# Patient Record
Sex: FEMALE | Race: ASIAN | NOT HISPANIC OR LATINO | URBAN - METROPOLITAN AREA
[De-identification: names, ages, dates, MRNs, and addresses within clinical notes are randomized per-mention and may not be internally consistent; named-entity substitution may affect disease eponyms.]

---

## 2023-01-30 ENCOUNTER — EMERGENCY (EMERGENCY)
Facility: HOSPITAL | Age: 33
LOS: 0 days | Discharge: HOME | End: 2023-01-30
Attending: EMERGENCY MEDICINE
Payer: SELF-PAY

## 2023-01-30 VITALS
SYSTOLIC BLOOD PRESSURE: 107 MMHG | HEART RATE: 84 BPM | DIASTOLIC BLOOD PRESSURE: 91 MMHG | OXYGEN SATURATION: 98 % | TEMPERATURE: 98 F | RESPIRATION RATE: 18 BRPM

## 2023-01-30 VITALS — HEIGHT: 64 IN | WEIGHT: 125.66 LBS

## 2023-01-30 DIAGNOSIS — R05.9 COUGH, UNSPECIFIED: ICD-10-CM

## 2023-01-30 DIAGNOSIS — R09.81 NASAL CONGESTION: ICD-10-CM

## 2023-01-30 DIAGNOSIS — J02.9 ACUTE PHARYNGITIS, UNSPECIFIED: ICD-10-CM

## 2023-01-30 DIAGNOSIS — B34.9 VIRAL INFECTION, UNSPECIFIED: ICD-10-CM

## 2023-01-30 PROCEDURE — 99284 EMERGENCY DEPT VISIT MOD MDM: CPT

## 2023-01-30 PROCEDURE — 99282 EMERGENCY DEPT VISIT SF MDM: CPT

## 2023-01-30 RX ORDER — CETIRIZINE HYDROCHLORIDE 10 MG/1
1 TABLET ORAL
Qty: 14 | Refills: 0
Start: 2023-01-30 | End: 2023-02-12

## 2023-01-30 NOTE — ED STATDOCS - PROGRESS NOTE DETAILS
31 y/o F with no PMH presents with sore throat, cough, congestion.   and daughter have similr symptoms. Daughter +for coronavirus sp recently. Denies fever. PE: Well appearing. Cardiac: s1s2, RRR. lungs: CTAB. Abdomen: NBS x4, soft, nontender. A/P: URI, will treat symptomatically and dc home. - Artem Macias PA-C

## 2023-01-30 NOTE — ED STATDOCS - ATTENDING APP SHARED VISIT CONTRIBUTION OF CARE
I personally saw the patient with the MURALI, and completed the key components of the history and physical exam. I then discussed the management plan with the MURALI.

## 2023-01-30 NOTE — ED STATDOCS - CARE PROVIDER_API CALL
Myles Young)  Internal Medicine  [address], [address], [place] [address]  Phone: [phone]  Fax: [phone]  Follow Up Time:

## 2023-01-30 NOTE — ED STATDOCS - PATIENT PORTAL LINK FT
You can access the FollowMyHealth Patient Portal offered by [facility] by registering at the following website: [email or web address]. By joining [employer]’s FollowMyHealth portal, you will also be able to view your health information using other applications (apps) compatible with our system.

## 2023-01-30 NOTE — ED STATDOCS - OBJECTIVE STATEMENT
31 y/o female with no pertinent PMHx presents to the ED with  and daughter c/o congestion, sore throat and cough x3 days. Denies fevers. Worse with laying down. Has been unable to sleep due to symptoms.

## 2023-01-30 NOTE — ED STATDOCS - NS ED ATTENDING STATEMENT MOD
This was a shared visit with the MURALI. I reviewed and verified the documentation and independently performed the documented:

## 2023-01-30 NOTE — ED STATDOCS - CLINICAL SUMMARY MEDICAL DECISION MAKING FREE TEXT BOX
33 y/o female with flu-like symptoms, c/o post nasal drip with worsening cough at night. Has sick contacts at home, daughter with +COVID. Will treat symptomatically and help obtain a PCP follow up.

## 2024-10-22 ENCOUNTER — APPOINTMENT (OUTPATIENT)
Dept: ANTEPARTUM | Facility: CLINIC | Age: 34
End: 2024-10-22

## 2024-10-22 ENCOUNTER — INPATIENT (INPATIENT)
Facility: HOSPITAL | Age: 34
LOS: 2 days | Discharge: HOME HEALTH | End: 2024-10-25
Attending: SPECIALIST | Admitting: SPECIALIST
Payer: COMMERCIAL

## 2024-10-22 VITALS — RESPIRATION RATE: 16 BRPM | TEMPERATURE: 99 F

## 2024-10-22 DIAGNOSIS — O42.90 PREMATURE RUPTURE OF MEMBRANES, UNSPECIFIED AS TO LENGTH OF TIME BETWEEN RUPTURE AND ONSET OF LABOR, UNSPECIFIED WEEKS OF GESTATION: ICD-10-CM

## 2024-10-22 DIAGNOSIS — O26.899 OTHER SPECIFIED PREGNANCY RELATED CONDITIONS, UNSPECIFIED TRIMESTER: ICD-10-CM

## 2024-10-22 LAB
ALBUMIN SERPL ELPH-MCNC: 3.5 G/DL — SIGNIFICANT CHANGE UP (ref 3.3–5)
ALP SERPL-CCNC: 197 U/L — HIGH (ref 40–120)
ALT FLD-CCNC: 45 U/L — HIGH (ref 4–33)
ANION GAP SERPL CALC-SCNC: 15 MMOL/L — HIGH (ref 7–14)
APPEARANCE UR: ABNORMAL
AST SERPL-CCNC: 29 U/L — SIGNIFICANT CHANGE UP (ref 4–32)
BACTERIA # UR AUTO: ABNORMAL /HPF
BASOPHILS # BLD AUTO: 0.07 K/UL — SIGNIFICANT CHANGE UP (ref 0–0.2)
BASOPHILS NFR BLD AUTO: 0.7 % — SIGNIFICANT CHANGE UP (ref 0–2)
BILIRUB SERPL-MCNC: 0.2 MG/DL — SIGNIFICANT CHANGE UP (ref 0.2–1.2)
BILIRUB UR-MCNC: NEGATIVE — SIGNIFICANT CHANGE UP
BLD GP AB SCN SERPL QL: NEGATIVE — SIGNIFICANT CHANGE UP
BUN SERPL-MCNC: 13 MG/DL — SIGNIFICANT CHANGE UP (ref 7–23)
CALCIUM SERPL-MCNC: 9.7 MG/DL — SIGNIFICANT CHANGE UP (ref 8.4–10.5)
CAST: 4 /LPF — SIGNIFICANT CHANGE UP (ref 0–4)
CHLORIDE SERPL-SCNC: 105 MMOL/L — SIGNIFICANT CHANGE UP (ref 98–107)
CO2 SERPL-SCNC: 19 MMOL/L — LOW (ref 22–31)
COLOR SPEC: YELLOW — SIGNIFICANT CHANGE UP
CREAT ?TM UR-MCNC: 24 MG/DL — SIGNIFICANT CHANGE UP
CREAT SERPL-MCNC: 0.67 MG/DL — SIGNIFICANT CHANGE UP (ref 0.5–1.3)
DIFF PNL FLD: NEGATIVE — SIGNIFICANT CHANGE UP
EGFR: 116 ML/MIN/1.73M2 — SIGNIFICANT CHANGE UP
EOSINOPHIL # BLD AUTO: 0.13 K/UL — SIGNIFICANT CHANGE UP (ref 0–0.5)
EOSINOPHIL NFR BLD AUTO: 1.4 % — SIGNIFICANT CHANGE UP (ref 0–6)
GLUCOSE SERPL-MCNC: 65 MG/DL — LOW (ref 70–99)
GLUCOSE UR QL: NEGATIVE MG/DL — SIGNIFICANT CHANGE UP
HCT VFR BLD CALC: 40.8 % — SIGNIFICANT CHANGE UP (ref 34.5–45)
HGB BLD-MCNC: 13.6 G/DL — SIGNIFICANT CHANGE UP (ref 11.5–15.5)
IANC: 6.08 K/UL — SIGNIFICANT CHANGE UP (ref 1.8–7.4)
IMM GRANULOCYTES NFR BLD AUTO: 1.5 % — HIGH (ref 0–0.9)
KETONES UR-MCNC: NEGATIVE MG/DL — SIGNIFICANT CHANGE UP
LDH SERPL L TO P-CCNC: 171 U/L — SIGNIFICANT CHANGE UP (ref 135–225)
LEUKOCYTE ESTERASE UR-ACNC: NEGATIVE — SIGNIFICANT CHANGE UP
LYMPHOCYTES # BLD AUTO: 2.06 K/UL — SIGNIFICANT CHANGE UP (ref 1–3.3)
LYMPHOCYTES # BLD AUTO: 21.7 % — SIGNIFICANT CHANGE UP (ref 13–44)
MCHC RBC-ENTMCNC: 25.7 PG — LOW (ref 27–34)
MCHC RBC-ENTMCNC: 33.3 GM/DL — SIGNIFICANT CHANGE UP (ref 32–36)
MCV RBC AUTO: 77.1 FL — LOW (ref 80–100)
MONOCYTES # BLD AUTO: 1.03 K/UL — HIGH (ref 0–0.9)
MONOCYTES NFR BLD AUTO: 10.8 % — SIGNIFICANT CHANGE UP (ref 2–14)
NEUTROPHILS # BLD AUTO: 6.08 K/UL — SIGNIFICANT CHANGE UP (ref 1.8–7.4)
NEUTROPHILS NFR BLD AUTO: 63.9 % — SIGNIFICANT CHANGE UP (ref 43–77)
NITRITE UR-MCNC: NEGATIVE — SIGNIFICANT CHANGE UP
NRBC # BLD: 0 /100 WBCS — SIGNIFICANT CHANGE UP (ref 0–0)
NRBC # FLD: 0.03 K/UL — HIGH (ref 0–0)
PH UR: 6.5 — SIGNIFICANT CHANGE UP (ref 5–8)
PLATELET # BLD AUTO: 131 K/UL — LOW (ref 150–400)
POTASSIUM SERPL-MCNC: 4.3 MMOL/L — SIGNIFICANT CHANGE UP (ref 3.5–5.3)
POTASSIUM SERPL-SCNC: 4.3 MMOL/L — SIGNIFICANT CHANGE UP (ref 3.5–5.3)
PROT ?TM UR-MCNC: 26 MG/DL — SIGNIFICANT CHANGE UP
PROT SERPL-MCNC: 6.7 G/DL — SIGNIFICANT CHANGE UP (ref 6–8.3)
PROT UR-MCNC: 30 MG/DL
PROT/CREAT UR-RTO: 1.1 RATIO — HIGH (ref 0–0.2)
RBC # BLD: 5.29 M/UL — HIGH (ref 3.8–5.2)
RBC # FLD: 13.8 % — SIGNIFICANT CHANGE UP (ref 10.3–14.5)
RBC CASTS # UR COMP ASSIST: SIGNIFICANT CHANGE UP /HPF (ref 0–4)
REVIEW: SIGNIFICANT CHANGE UP
RH IG SCN BLD-IMP: POSITIVE — SIGNIFICANT CHANGE UP
RH IG SCN BLD-IMP: POSITIVE — SIGNIFICANT CHANGE UP
SODIUM SERPL-SCNC: 139 MMOL/L — SIGNIFICANT CHANGE UP (ref 135–145)
SP GR SPEC: 1.01 — SIGNIFICANT CHANGE UP (ref 1–1.03)
SQUAMOUS # UR AUTO: 7 /HPF — HIGH (ref 0–5)
URATE SERPL-MCNC: 8.1 MG/DL — HIGH (ref 2.5–7)
UROBILINOGEN FLD QL: 0.2 MG/DL — SIGNIFICANT CHANGE UP (ref 0.2–1)
WBC # BLD: 9.51 K/UL — SIGNIFICANT CHANGE UP (ref 3.8–10.5)
WBC # FLD AUTO: 9.51 K/UL — SIGNIFICANT CHANGE UP (ref 3.8–10.5)
WBC UR QL: 4 /HPF — SIGNIFICANT CHANGE UP (ref 0–5)

## 2024-10-22 RX ORDER — OXYTOCIN IN D5W-0.2% SODIUM CL 15/250 ML
167 PLASTIC BAG, INJECTION (ML) INTRAVENOUS
Qty: 30 | Refills: 0 | Status: DISCONTINUED | OUTPATIENT
Start: 2024-10-22 | End: 2024-10-23

## 2024-10-22 RX ORDER — FAMOTIDINE 10 MG/ML
20 INJECTION INTRAVENOUS ONCE
Refills: 0 | Status: DISCONTINUED | OUTPATIENT
Start: 2024-10-22 | End: 2024-10-22

## 2024-10-22 RX ORDER — ONDANSETRON HYDROCHLORIDE 2 MG/ML
4 INJECTION, SOLUTION INTRAMUSCULAR; INTRAVENOUS ONCE
Refills: 0 | Status: DISCONTINUED | OUTPATIENT
Start: 2024-10-22 | End: 2024-10-22

## 2024-10-22 RX ORDER — CHOLECALCIFEROL (VITAMIN D3) 625 MCG
1 CAPSULE ORAL
Refills: 0 | DISCHARGE

## 2024-10-22 RX ORDER — ACETAMINOPHEN 500 MG
1000 TABLET ORAL ONCE
Refills: 0 | Status: DISCONTINUED | OUTPATIENT
Start: 2024-10-22 | End: 2024-10-22

## 2024-10-22 RX ORDER — CHLORHEXIDINE GLUCONATE 40 MG/ML
1 SOLUTION TOPICAL DAILY
Refills: 0 | Status: DISCONTINUED | OUTPATIENT
Start: 2024-10-22 | End: 2024-10-23

## 2024-10-22 RX ADMIN — CHLORHEXIDINE GLUCONATE 1 APPLICATION(S): 40 SOLUTION TOPICAL at 21:32

## 2024-10-22 RX ADMIN — Medication 125 MILLILITER(S): at 21:32

## 2024-10-22 NOTE — OB PROVIDER H&P - PROBLEM SELECTOR PLAN 1
D/w Dr Rosales  -Admit to labor and delivery  -Pain Management: Approved for epidural  -Cont EFM/[place]  -Admission labs: CBC, RPR, T&S  -IV hydration  -Clear liquid diet  -Cytotec PO for IOL

## 2024-10-22 NOTE — OB RN TRIAGE NOTE - FALL HARM RISK - UNIVERSAL INTERVENTIONS
Bed in lowest position, wheels locked, appropriate side rails in place/Call bell, personal items and telephone in reach/Instruct patient to call for assistance before getting out of bed or chair/Non-slip footwear when patient is out of bed/[place] to call system/Physically safe environment - no spills, clutter or unnecessary equipment/Purposeful Proactive Rounding/Room/bathroom lighting operational, light cord in reach

## 2024-10-22 NOTE — OB PROVIDER H&P - NSLOWPPHRISK_OBGYN_A_OB
No previous uterine incision/[place] Pregnancy/Less than or equal to 4 previous vaginal births/No known bleeding disorder/No history of postpartum hemorrhage/No other PPH risks indicated

## 2024-10-22 NOTE — OB RN PATIENT PROFILE - NSGBSSTATUS_OBGYN_ALL_OB
Detail Level: Simple Continue Regimen: Triamcinolone cream- use prn for flares Render In Strict Bullet Format?: No Negative

## 2024-10-22 NOTE — OB RN PATIENT PROFILE - 'COMMUNITY AGENCIES/SUPPORT GROUPS, OB PROFILE
pt reports qualifying for WIC and going to office but not able to get scheduled for an appointment to enrol

## 2024-10-22 NOTE — OB RN PATIENT PROFILE - PRO PRENATAL LABS ORI SOURCE HBSAG
Addended by: BECKY OZUNA JR. on: 5/16/2022 04:23 PM     Modules accepted: Orders    
show
hard copy, drawn during this pregnancy

## 2024-10-22 NOTE — OB PROVIDER H&P - NSHPPHYSICALEXAM_GEN_ALL_CORE
T(C): 37.2 (10-22-24 @ 19:38), Max: 37.2 (10-22-24 @ 19:28)  HR: 51 (10-22-24 @ 21:29) (46 - 62)  BP: 120/76 (10-22-24 @ 21:29) (120/73 - 142/80)  RR: 16 (10-22-24 @ 19:38) (16 - 16)    Heart: RRR  Lungs: CTA  Abdomen: Gravid, soft, NT    NST: Reactive with moderate variability, Category 1 tracing  [place]: Irregular contractions  VE: 3/60/-3, gross ROM, clear fluid  TAS: Cephalic presentation

## 2024-10-22 NOTE — OB RN TRIAGE NOTE - PAIN SCALE PREFERRED, PROFILE
This visit was performed via live interactive two-way video.    During their visit, the patient was informed that their consent to treat includes permission to submit claims to their insurance on their behalf for the services received.  Clinician Location: [facility]    Patient Location: Home       This office note has been dictated.      It should be noted that the patient's list of drug allergies/sensitivities, current medication list, problem list, and past medical history were reviewed today by me with the patient and in Epic, and updated accordingly.    The following diagnoses have been reviewed with the patient at today's visit and appropriate recommendations made:    Anemia, unspecified type  (primary encounter diagnosis)  Acute on chronic systolic congestive heart failure (CMS/HCC)  Mild major depression (CMS/HCC)  Morbid obesity (CMS/HCC)  Personal history of colonic polyps  Severe obesity (BMI 35.0-39.9) with comorbidity (CMS/HCC)       numerical 0-10

## 2024-10-22 NOTE — OB PROVIDER H&P - HISTORY OF PRESENT ILLNESS
34y  at 37w6d presents to triage c/o LOF since 6:15pm today, reports clear fluid. Pt states feeling mild contractions. Reports +FM, no vaginal bleeding  Prenatal care: Dr Cabral  GBS: Negative    Prenatal course complicated by:   - h/o hypothyroidism on Synthroid

## 2024-10-22 NOTE — OB PROVIDER H&P - NS_CTXBYPALP_OBGYN_ALL_OB
Impression: Primary open-angle glaucoma, left eye, severe stage: A93.1417. CCT: 534/522         S/P LPI OU Plan: IOP is well lowered on Cosopt BID OU and Vyzulta QHS OS. Will continue on current regime. Pt. c/o irritation OU today. Will continue to monitor closely. Patient to call us sooner, onset of new visual changes. Vyzulta samples given. Due to increased VF defects will get second opinion by Glaucoma spec.  

FINAL MEDS: COSOPT OU BID and Vyzulta QHS OS Only Mild

## 2024-10-22 NOTE — OB PROVIDER H&P - ASSESSMENT
34y  at 37w6d s/p PROM @ 6:15pm  - GBS: Negative  - Pt found to have elevated BP in triage, denies any headache, no visual changes, no epigastric pain, no nausea, no vomiting  D/w Dr Rosales  -Admit to labor and delivery  -Pain Management: Approved for epidural  -Cont EFM/[place]  -Admission labs: CBC, RPR, T&S  -IV hydration  -Clear liquid diet  -Cytotec PO for IOL

## 2024-10-22 NOTE — OB RN PATIENT PROFILE - AS SC BRADEN ACTIVITY
Time spent on patient encounter including emergency department chart review, history taking and physical exam, developing patient plan and tailoring H&P, as well as discussion with patient, family, RN, and other providers involved in patient's care.
(4) walks frequently

## 2024-10-22 NOTE — OB PROVIDER H&P - LABOR: FETAL STATION
From: Emanuel Hines  To: Kirk Willoughby MD  Sent: 3/20/2019 12:10 PM CDT  Subject: Medication Question    Hi,  Dr. Yuen's office asked that I contact you about the warfarin. They need it noted in [facility] that withholding warfarin is ok. They want me to withhold for three days, you had mentioned 5 days, like usual. I told them and they reiterated they only need three. I am happy to do any amount you want. Appt is April 3rd.    Thank you for contacting them to [facility] (I am assuming you did it).    Thanks,  Emanuel  
-3

## 2024-10-23 ENCOUNTER — TRANSCRIPTION ENCOUNTER (OUTPATIENT)
Age: 34
End: 2024-10-23

## 2024-10-23 LAB
ALBUMIN SERPL ELPH-MCNC: 3.3 G/DL — SIGNIFICANT CHANGE UP (ref 3.3–5)
ALP SERPL-CCNC: 175 U/L — HIGH (ref 40–120)
ALT FLD-CCNC: 46 U/L — HIGH (ref 4–33)
ANION GAP SERPL CALC-SCNC: 13 MMOL/L — SIGNIFICANT CHANGE UP (ref 7–14)
AST SERPL-CCNC: 38 U/L — HIGH (ref 4–32)
BASOPHILS # BLD AUTO: 0.05 K/UL — SIGNIFICANT CHANGE UP (ref 0–0.2)
BASOPHILS NFR BLD AUTO: 0.4 % — SIGNIFICANT CHANGE UP (ref 0–2)
BILIRUB SERPL-MCNC: 0.2 MG/DL — SIGNIFICANT CHANGE UP (ref 0.2–1.2)
BUN SERPL-MCNC: 15 MG/DL — SIGNIFICANT CHANGE UP (ref 7–23)
CALCIUM SERPL-MCNC: 9.2 MG/DL — SIGNIFICANT CHANGE UP (ref 8.4–10.5)
CHLORIDE SERPL-SCNC: 104 MMOL/L — SIGNIFICANT CHANGE UP (ref 98–107)
CO2 SERPL-SCNC: 21 MMOL/L — LOW (ref 22–31)
CREAT SERPL-MCNC: 0.87 MG/DL — SIGNIFICANT CHANGE UP (ref 0.5–1.3)
EGFR: 88 ML/MIN/1.73M2 — SIGNIFICANT CHANGE UP
EOSINOPHIL # BLD AUTO: 0.04 K/UL — SIGNIFICANT CHANGE UP (ref 0–0.5)
EOSINOPHIL NFR BLD AUTO: 0.3 % — SIGNIFICANT CHANGE UP (ref 0–6)
GLUCOSE SERPL-MCNC: 82 MG/DL — SIGNIFICANT CHANGE UP (ref 70–99)
HCT VFR BLD CALC: 37.2 % — SIGNIFICANT CHANGE UP (ref 34.5–45)
HGB BLD-MCNC: 12.4 G/DL — SIGNIFICANT CHANGE UP (ref 11.5–15.5)
IANC: 10.23 K/UL — HIGH (ref 1.8–7.4)
IMM GRANULOCYTES NFR BLD AUTO: 1 % — HIGH (ref 0–0.9)
LDH SERPL L TO P-CCNC: 216 U/L — SIGNIFICANT CHANGE UP (ref 135–225)
LYMPHOCYTES # BLD AUTO: 1.98 K/UL — SIGNIFICANT CHANGE UP (ref 1–3.3)
LYMPHOCYTES # BLD AUTO: 14.2 % — SIGNIFICANT CHANGE UP (ref 13–44)
MCHC RBC-ENTMCNC: 25.7 PG — LOW (ref 27–34)
MCHC RBC-ENTMCNC: 33.3 GM/DL — SIGNIFICANT CHANGE UP (ref 32–36)
MCV RBC AUTO: 77 FL — LOW (ref 80–100)
MONOCYTES # BLD AUTO: 1.5 K/UL — HIGH (ref 0–0.9)
MONOCYTES NFR BLD AUTO: 10.8 % — SIGNIFICANT CHANGE UP (ref 2–14)
NEUTROPHILS # BLD AUTO: 10.23 K/UL — HIGH (ref 1.8–7.4)
NEUTROPHILS NFR BLD AUTO: 73.3 % — SIGNIFICANT CHANGE UP (ref 43–77)
NRBC # BLD: 0 /100 WBCS — SIGNIFICANT CHANGE UP (ref 0–0)
NRBC # FLD: 0.02 K/UL — HIGH (ref 0–0)
PLATELET # BLD AUTO: 129 K/UL — LOW (ref 150–400)
POTASSIUM SERPL-MCNC: 4.4 MMOL/L — SIGNIFICANT CHANGE UP (ref 3.5–5.3)
POTASSIUM SERPL-SCNC: 4.4 MMOL/L — SIGNIFICANT CHANGE UP (ref 3.5–5.3)
PROT SERPL-MCNC: 5.9 G/DL — LOW (ref 6–8.3)
RBC # BLD: 4.83 M/UL — SIGNIFICANT CHANGE UP (ref 3.8–5.2)
RBC # FLD: 13.9 % — SIGNIFICANT CHANGE UP (ref 10.3–14.5)
SODIUM SERPL-SCNC: 138 MMOL/L — SIGNIFICANT CHANGE UP (ref 135–145)
T PALLIDUM AB TITR SER: NEGATIVE — SIGNIFICANT CHANGE UP
URATE SERPL-MCNC: 8.3 MG/DL — HIGH (ref 2.5–7)
WBC # BLD: 13.94 K/UL — HIGH (ref 3.8–10.5)
WBC # FLD AUTO: 13.94 K/UL — HIGH (ref 3.8–10.5)

## 2024-10-23 PROCEDURE — 93010 ELECTROCARDIOGRAM REPORT: CPT

## 2024-10-23 RX ORDER — HYDROCORTISONE 1 %
1 OINTMENT (GRAM) TOPICAL EVERY 6 HOURS
Refills: 0 | Status: DISCONTINUED | OUTPATIENT
Start: 2024-10-23 | End: 2024-10-25

## 2024-10-23 RX ORDER — IBUPROFEN 200 MG
600 TABLET ORAL EVERY 6 HOURS
Refills: 0 | Status: COMPLETED | OUTPATIENT
Start: 2024-10-23 | End: 2025-09-21

## 2024-10-23 RX ORDER — MODIFIED LANOLIN
1 OINTMENT (GRAM) TOPICAL EVERY 6 HOURS
Refills: 0 | Status: DISCONTINUED | OUTPATIENT
Start: 2024-10-23 | End: 2024-10-25

## 2024-10-23 RX ORDER — CLOSTRIDIUM TETANI TOXOID ANTIGEN (FORMALDEHYDE INACTIVATED), CORYNEBACTERIUM DIPHTHERIAE TOXOID ANTIGEN (FORMALDEHYDE INACTIVATED), BORDETELLA PERTUSSIS TOXOID ANTIGEN (GLUTARALDEHYDE INACTIVATED), BORDETELLA PERTUSSIS FILAMENTOUS HEMAGGLUTININ ANTIGEN (FORMALDEHYDE INACTIVATED), BORDETELLA PERTUSSIS PERTACTIN ANTIGEN, AND BORDETELLA PERTUSSIS FIMBRIAE 2/3 ANTIGEN 5; 2; 2.5; 5; 3; 5 [LF]/.5ML; [LF]/.5ML; UG/.5ML; UG/.5ML; UG/.5ML; UG/.5ML
0.5 INJECTION, SUSPENSION INTRAMUSCULAR ONCE
Refills: 0 | Status: DISCONTINUED | OUTPATIENT
Start: 2024-10-23 | End: 2024-10-25

## 2024-10-23 RX ORDER — DIPHENHYDRAMINE HCL 12.5MG/5ML
25 ELIXIR ORAL EVERY 6 HOURS
Refills: 0 | Status: DISCONTINUED | OUTPATIENT
Start: 2024-10-23 | End: 2024-10-25

## 2024-10-23 RX ORDER — ACETAMINOPHEN 500 MG
975 TABLET ORAL
Refills: 0 | Status: DISCONTINUED | OUTPATIENT
Start: 2024-10-23 | End: 2024-10-25

## 2024-10-23 RX ORDER — OXYTOCIN IN D5W-0.2% SODIUM CL 15/250 ML
167 PLASTIC BAG, INJECTION (ML) INTRAVENOUS
Qty: 30 | Refills: 0 | Status: DISCONTINUED | OUTPATIENT
Start: 2024-10-23 | End: 2024-10-23

## 2024-10-23 RX ORDER — SIMETHICONE 80 MG/1
80 TABLET, CHEWABLE ORAL EVERY 4 HOURS
Refills: 0 | Status: DISCONTINUED | OUTPATIENT
Start: 2024-10-23 | End: 2024-10-25

## 2024-10-23 RX ORDER — IBUPROFEN 200 MG
600 TABLET ORAL EVERY 6 HOURS
Refills: 0 | Status: DISCONTINUED | OUTPATIENT
Start: 2024-10-23 | End: 2024-10-25

## 2024-10-23 RX ORDER — LEVOTHYROXINE SODIUM 88 MCG
25 TABLET ORAL DAILY
Refills: 0 | Status: DISCONTINUED | OUTPATIENT
Start: 2024-10-23 | End: 2024-10-25

## 2024-10-23 RX ORDER — LEVOTHYROXINE SODIUM 88 MCG
1 TABLET ORAL
Refills: 0 | DISCHARGE

## 2024-10-23 RX ORDER — LEVOTHYROXINE SODIUM 88 MCG
50 TABLET ORAL DAILY
Refills: 0 | Status: DISCONTINUED | OUTPATIENT
Start: 2024-10-23 | End: 2024-10-23

## 2024-10-23 RX ORDER — DIBUCAINE 1 %
1 OINTMENT (GRAM) TOPICAL EVERY 6 HOURS
Refills: 0 | Status: DISCONTINUED | OUTPATIENT
Start: 2024-10-23 | End: 2024-10-25

## 2024-10-23 RX ORDER — MAGNESIUM HYDROXIDE 1200 MG/15ML
30 SUSPENSION ORAL
Refills: 0 | Status: DISCONTINUED | OUTPATIENT
Start: 2024-10-23 | End: 2024-10-25

## 2024-10-23 RX ORDER — ACETAMINOPHEN 500 MG
3 TABLET ORAL
Qty: 0 | Refills: 0 | DISCHARGE
Start: 2024-10-23

## 2024-10-23 RX ORDER — SODIUM CHLORIDE 9 MG/ML
3 INJECTION, SOLUTION INTRAMUSCULAR; INTRAVENOUS; SUBCUTANEOUS EVERY 8 HOURS
Refills: 0 | Status: DISCONTINUED | OUTPATIENT
Start: 2024-10-23 | End: 2024-10-25

## 2024-10-23 RX ORDER — KETOROLAC TROMETHAMINE 30 MG/ML
30 INJECTION INTRAMUSCULAR; INTRAVENOUS ONCE
Refills: 0 | Status: DISCONTINUED | OUTPATIENT
Start: 2024-10-23 | End: 2024-10-23

## 2024-10-23 RX ORDER — PRENATAL VIT/IRON FUM/FOLIC AC 60 MG-1 MG
1 TABLET ORAL
Refills: 0 | DISCHARGE

## 2024-10-23 RX ORDER — OXYCODONE HYDROCHLORIDE 30 MG/1
5 TABLET ORAL
Refills: 0 | Status: DISCONTINUED | OUTPATIENT
Start: 2024-10-23 | End: 2024-10-25

## 2024-10-23 RX ORDER — BENZOCAINE 200 MG/G
1 GEL ORAL EVERY 6 HOURS
Refills: 0 | Status: DISCONTINUED | OUTPATIENT
Start: 2024-10-23 | End: 2024-10-25

## 2024-10-23 RX ORDER — IBUPROFEN 200 MG
1 TABLET ORAL
Qty: 0 | Refills: 0 | DISCHARGE
Start: 2024-10-23

## 2024-10-23 RX ORDER — OXYCODONE HYDROCHLORIDE 30 MG/1
5 TABLET ORAL ONCE
Refills: 0 | Status: DISCONTINUED | OUTPATIENT
Start: 2024-10-23 | End: 2024-10-25

## 2024-10-23 RX ORDER — PRAMOXINE HCL 1 %
1 CREAM (GRAM) RECTAL EVERY 4 HOURS
Refills: 0 | Status: DISCONTINUED | OUTPATIENT
Start: 2024-10-23 | End: 2024-10-25

## 2024-10-23 RX ORDER — PRENATAL VIT/IRON FUM/FOLIC AC 60 MG-1 MG
1 TABLET ORAL
Qty: 0 | Refills: 0 | DISCHARGE
Start: 2024-10-23

## 2024-10-23 RX ORDER — PRENATAL VIT/IRON FUM/FOLIC AC 60 MG-1 MG
1 TABLET ORAL DAILY
Refills: 0 | Status: DISCONTINUED | OUTPATIENT
Start: 2024-10-23 | End: 2024-10-25

## 2024-10-23 RX ADMIN — Medication 25 MICROGRAM(S): at 07:45

## 2024-10-23 RX ADMIN — KETOROLAC TROMETHAMINE 30 MILLIGRAM(S): 30 INJECTION INTRAMUSCULAR; INTRAVENOUS at 07:55

## 2024-10-23 RX ADMIN — Medication 600 MILLIGRAM(S): at 17:41

## 2024-10-23 RX ADMIN — SODIUM CHLORIDE 3 MILLILITER(S): 9 INJECTION, SOLUTION INTRAMUSCULAR; INTRAVENOUS; SUBCUTANEOUS at 21:33

## 2024-10-23 RX ADMIN — Medication 600 MILLIGRAM(S): at 23:51

## 2024-10-23 RX ADMIN — Medication 600 MILLIGRAM(S): at 18:15

## 2024-10-23 RX ADMIN — KETOROLAC TROMETHAMINE 30 MILLIGRAM(S): 30 INJECTION INTRAMUSCULAR; INTRAVENOUS at 06:20

## 2024-10-23 RX ADMIN — Medication 975 MILLIGRAM(S): at 21:23

## 2024-10-23 RX ADMIN — SODIUM CHLORIDE 3 MILLILITER(S): 9 INJECTION, SOLUTION INTRAMUSCULAR; INTRAVENOUS; SUBCUTANEOUS at 15:30

## 2024-10-23 RX ADMIN — Medication 975 MILLIGRAM(S): at 22:20

## 2024-10-23 RX ADMIN — Medication 167 MILLIUNIT(S)/MIN: at 05:56

## 2024-10-23 NOTE — LACTATION INITIAL EVALUATION - NS_LMP_OBGYN_ALL_OB_DT
Postoperatively, undifferentiated, resolved  Weaned off Wilder-Synephrine, no epi drip never started  Status post additional fluid bolus given findings on bedside echo and no evidence of decompensation of heart failure, blood pressure improved and pressors weaned off  Following serial CBC, no evidence of bleeding clinically, hematocrit now normal   31-Jan-2024

## 2024-10-23 NOTE — DISCHARGE NOTE OB - FINANCIAL ASSISTANCE
[facility] provides services at a reduced cost to those who are determined to be eligible through [facility]’s financial assistance program. Information regarding [facility]’s financial assistance program can be found by going to [email or web address] or by calling [phone].

## 2024-10-23 NOTE — DISCHARGE NOTE OB - CARE PROVIDER_API CALL
Joe Cabral  Obstetrics and Gynecology  [address] [place] [address], [address]  [place] [address]  Phone: [phone]  Fax: [phone]  Follow Up Time:

## 2024-10-23 NOTE — OB RN DELIVERY SUMMARY - NS_SEPSISRSKCALC_OBGYN_ALL_OB_FT
EOS calculated successfully. EOS Risk Factor: 0.5/1000 live births ([facility] national incidence); GA=38w;Temp=99; ROM=11.05; GBS='Negative'; Antibiotics='No antibiotics or any antibiotics < 2 hrs prior to birth'

## 2024-10-23 NOTE — DISCHARGE NOTE OB - PATIENT PORTAL LINK FT
You can access the FollowMyHealth Patient Portal offered by [facility] by registering at the following website: [email or web address]. By joining [employer]’s FollowMyHealth portal, you will also be able to view your health information using other applications (apps) compatible with our system.

## 2024-10-23 NOTE — LACTATION INITIAL EVALUATION - LACTATION INTERVENTIONS
initiate/review safe skin-to-skin/initiate/review hand expression/initiate/review techniques for position and latch/review techniques to increase milk supply/initiate/review breast massage/compression/reviewed components of an effective feeding and at least 8 effective feedings per day required/reviewed importance of monitoring infant diapers, the breastfeeding log, and minimum output each day/reviewed risks of unnecessary formula supplementation/reviewed benefits and recommendations for rooming in/reviewed feeding on demand/by cue at least 8 times a day

## 2024-10-23 NOTE — DISCHARGE NOTE OB - CARE PLAN
Principal Discharge DX:	Vaginal delivery  Assessment and plan of treatment:	After discharge, please stay on pelvic rest for 6 weeks, meaning no sexual intercourse, no tampons and no douching.  No driving for 2 weeks.  No lifting objects heavier than baby for 2 weeks.  Expect to have vaginal bleeding/spotting for up to six weeks.  The bleeding should get lighter and more white/light brown with time.  For bleeding soaking more than a pad an hour or passing clots greater than the size of your fist, come in to the   emergency department.  Follow up in the office in 6 weeks  Secondary Diagnosis:	Pre-eclampsia  Assessment and plan of treatment:	Follow-up in your OB office within 1 week for a blood pressure check.   Please take your blood pressure 3x/day. Call your doctor if your blood pressure is 140 (top number) OR 90 (bottom number) or higher. Return to hospital if your blood pressure is 160 (top number)  (bottom number) or higher. Call your doctor if you experience symptoms such as headache, blurry vision, epigastric pain, or nausea/vomiting.   1

## 2024-10-23 NOTE — OB PROVIDER DELIVERY SUMMARY - NSSELHIDDEN_OBGYN_ALL_OB_FT
[NS_DeliveryAttending1_OBGYN_ALL_OB_FT:[id number]],[NS_DeliveryAssist1_OBGYN_ALL_OB_FT:[id number]]

## 2024-10-23 NOTE — DISCHARGE NOTE OB - MEDICATION SUMMARY - MEDICATIONS TO TAKE
I will START or STAY ON the medications listed below when I get home from the hospital:    ibuprofen 600 mg oral tablet  -- 1 tab(s) by mouth every 6 hours as needed for  moderate pain  -- Indication: For Pain    acetaminophen 325 mg oral tablet  -- 3 tab(s) by mouth every 6 hours as needed for  mild pain  -- Indication: For Pain    multivitamin, prenatal  -- 1 tab(s) by mouth once a day  -- Indication: For Vitamin    Vitamin D3 25 mcg (1000 intl units) oral tablet  -- 1 tab(s) by mouth once a day  -- Indication: For Vitamin

## 2024-10-23 NOTE — OB RN DELIVERY SUMMARY - NSSELHIDDEN_OBGYN_ALL_OB_FT
[NS_[email or web address]_ALL_OB_FT:[id number]],[NS_DeliveryAssist1_OBGYN_ALL_OB_FT:[id number]],[NS_DeliveryRN_OBGYN_ALL_OB_FT:[id number]=]

## 2024-10-23 NOTE — DISCHARGE NOTE OB - PLAN OF CARE
Follow-up in your OB office within 1 week for a blood pressure check.   Please take your blood pressure 3x/day. Call your doctor if your blood pressure is 140 (top number) OR 90 (bottom number) or higher. Return to hospital if your blood pressure is 160 (top number)  (bottom number) or higher. Call your doctor if you experience symptoms such as headache, blurry vision, epigastric pain, or nausea/vomiting. After discharge, please stay on pelvic rest for 6 weeks, meaning no sexual intercourse, no tampons and no douching.  No driving for 2 weeks.  No lifting objects heavier than baby for 2 weeks.  Expect to have vaginal bleeding/spotting for up to six weeks.  The bleeding should get lighter and more white/light brown with time.  For bleeding soaking more than a pad an hour or passing clots greater than the size of your fist, come in to the   emergency department.  Follow up in the office in 6 weeks

## 2024-10-23 NOTE — LACTATION INITIAL EVALUATION - INTERVENTION OUTCOME
Utilized Guide to Postpartum and [place] [facility] book as a visual reference for mom to better understand teaching points and to utilize at home  to review the education presented during hospitalization. Instructed in hand expression with good return demonstration. + colostrum noted. Encouraged to breastfeed the baby on demand based on cues and at least 8-12 times in a day. Instructed to log feedings along with wet and dirty diapers.  <24 hours old, mother just fed, Encouraged to call for assistance as needed. lactation to follow up./verbalizes understanding/demonstrates understanding of teaching/good return demonstration/needs met/Lactation team to follow up

## 2024-10-23 NOTE — OB RN DELIVERY SUMMARY - NS_CORDVESSELS_OBGYN_ALL_OB
Received a call from patients wife Kirsty who we have ambulatory consent to speak with. She says that she does not think [facility] coverage covers Cardiology, just EP and is needing to reschedule the CT and office visit after. Chart review, telephone encounter on 5/31/22 shows [facility] auth for Cardiology expires 7/5/23. Discussed with Kirsty. She is going to reschedule CT and call the clinic back after to move the office visit to after the testing is complete. She had no other questions at this time. Writer transferred her to pre services to assist.    3

## 2024-10-23 NOTE — PROVIDER CONTACT NOTE (OTHER) - BACKGROUND
38 week NSD from 10/23 ! 0518,  2nd degree laceration. gHTN- PCR 1.1 preeclamptic without severe features. HELLP labs sent to be resent 0600. Hypothyroidism on synthroid 25mcg.

## 2024-10-23 NOTE — OB PROVIDER DELIVERY SUMMARY - NSPROVIDERDELIVERYNOTE_OBGYN_ALL_OB_FT
Spontaneous vaginal delivery of liveborn infant from OA position. Head, shoulders, and body delivered easily. Infant was suctioned. No mec. Delayed cord clamping and infant was passed to mother. Cord clamped and cut. Placenta delivered intact. Fundal massage was given and uterine fundus was found to be firm. Vaginal exam revealed an intact cervix, vaginal walls and sulci. Patient had a 2nd degree laceration in the perineum that was repaired with 2.0 chromic suture. Excellent hemostasis was noted. Patient was stable and went to recovery. Count was correct x 2.    Millie Calin PGY4
No

## 2024-10-24 LAB
ALBUMIN SERPL ELPH-MCNC: 3.2 G/DL — LOW (ref 3.3–5)
ALP SERPL-CCNC: 166 U/L — HIGH (ref 40–120)
ALT FLD-CCNC: 47 U/L — HIGH (ref 4–33)
ANION GAP SERPL CALC-SCNC: 14 MMOL/L — SIGNIFICANT CHANGE UP (ref 7–14)
AST SERPL-CCNC: 37 U/L — HIGH (ref 4–32)
BASOPHILS # BLD AUTO: 0.08 K/UL — SIGNIFICANT CHANGE UP (ref 0–0.2)
BASOPHILS NFR BLD AUTO: 0.6 % — SIGNIFICANT CHANGE UP (ref 0–2)
BILIRUB SERPL-MCNC: <0.2 MG/DL — SIGNIFICANT CHANGE UP (ref 0.2–1.2)
BUN SERPL-MCNC: 16 MG/DL — SIGNIFICANT CHANGE UP (ref 7–23)
CALCIUM SERPL-MCNC: 9 MG/DL — SIGNIFICANT CHANGE UP (ref 8.4–10.5)
CHLORIDE SERPL-SCNC: 102 MMOL/L — SIGNIFICANT CHANGE UP (ref 98–107)
CO2 SERPL-SCNC: 21 MMOL/L — LOW (ref 22–31)
CREAT SERPL-MCNC: 0.75 MG/DL — SIGNIFICANT CHANGE UP (ref 0.5–1.3)
EGFR: 106 ML/MIN/1.73M2 — SIGNIFICANT CHANGE UP
EOSINOPHIL # BLD AUTO: 0.17 K/UL — SIGNIFICANT CHANGE UP (ref 0–0.5)
EOSINOPHIL NFR BLD AUTO: 1.3 % — SIGNIFICANT CHANGE UP (ref 0–6)
GLUCOSE SERPL-MCNC: 57 MG/DL — LOW (ref 70–99)
HCT VFR BLD CALC: 37.9 % — SIGNIFICANT CHANGE UP (ref 34.5–45)
HGB BLD-MCNC: 12.7 G/DL — SIGNIFICANT CHANGE UP (ref 11.5–15.5)
IANC: 7.94 K/UL — HIGH (ref 1.8–7.4)
IMM GRANULOCYTES NFR BLD AUTO: 1.1 % — HIGH (ref 0–0.9)
LDH SERPL L TO P-CCNC: 197 U/L — SIGNIFICANT CHANGE UP (ref 135–225)
LYMPHOCYTES # BLD AUTO: 25.3 % — SIGNIFICANT CHANGE UP (ref 13–44)
LYMPHOCYTES # BLD AUTO: 3.28 K/UL — SIGNIFICANT CHANGE UP (ref 1–3.3)
MCHC RBC-ENTMCNC: 25.7 PG — LOW (ref 27–34)
MCHC RBC-ENTMCNC: 33.5 GM/DL — SIGNIFICANT CHANGE UP (ref 32–36)
MCV RBC AUTO: 76.7 FL — LOW (ref 80–100)
MONOCYTES # BLD AUTO: 1.38 K/UL — HIGH (ref 0–0.9)
MONOCYTES NFR BLD AUTO: 10.6 % — SIGNIFICANT CHANGE UP (ref 2–14)
NEUTROPHILS # BLD AUTO: 7.94 K/UL — HIGH (ref 1.8–7.4)
NEUTROPHILS NFR BLD AUTO: 61.1 % — SIGNIFICANT CHANGE UP (ref 43–77)
NRBC # BLD: 0 /100 WBCS — SIGNIFICANT CHANGE UP (ref 0–0)
NRBC # FLD: 0.02 K/UL — HIGH (ref 0–0)
PLATELET # BLD AUTO: 125 K/UL — LOW (ref 150–400)
POTASSIUM SERPL-MCNC: 4.4 MMOL/L — SIGNIFICANT CHANGE UP (ref 3.5–5.3)
POTASSIUM SERPL-SCNC: 4.4 MMOL/L — SIGNIFICANT CHANGE UP (ref 3.5–5.3)
PROT SERPL-MCNC: 6 G/DL — SIGNIFICANT CHANGE UP (ref 6–8.3)
RBC # BLD: 4.94 M/UL — SIGNIFICANT CHANGE UP (ref 3.8–5.2)
RBC # FLD: 13.5 % — SIGNIFICANT CHANGE UP (ref 10.3–14.5)
SODIUM SERPL-SCNC: 137 MMOL/L — SIGNIFICANT CHANGE UP (ref 135–145)
URATE SERPL-MCNC: 7.5 MG/DL — HIGH (ref 2.5–7)
WBC # BLD: 12.99 K/UL — HIGH (ref 3.8–10.5)
WBC # FLD AUTO: 12.99 K/UL — HIGH (ref 3.8–10.5)

## 2024-10-24 RX ADMIN — Medication 600 MILLIGRAM(S): at 18:45

## 2024-10-24 RX ADMIN — SODIUM CHLORIDE 3 MILLILITER(S): 9 INJECTION, SOLUTION INTRAMUSCULAR; INTRAVENOUS; SUBCUTANEOUS at 21:09

## 2024-10-24 RX ADMIN — SODIUM CHLORIDE 3 MILLILITER(S): 9 INJECTION, SOLUTION INTRAMUSCULAR; INTRAVENOUS; SUBCUTANEOUS at 05:41

## 2024-10-24 RX ADMIN — Medication 600 MILLIGRAM(S): at 00:50

## 2024-10-24 RX ADMIN — SODIUM CHLORIDE 3 MILLILITER(S): 9 INJECTION, SOLUTION INTRAMUSCULAR; INTRAVENOUS; SUBCUTANEOUS at 14:15

## 2024-10-24 RX ADMIN — Medication 600 MILLIGRAM(S): at 23:41

## 2024-10-24 RX ADMIN — Medication 25 MICROGRAM(S): at 06:30

## 2024-10-24 RX ADMIN — Medication 600 MILLIGRAM(S): at 06:20

## 2024-10-24 RX ADMIN — Medication 975 MILLIGRAM(S): at 08:14

## 2024-10-24 RX ADMIN — Medication 600 MILLIGRAM(S): at 12:48

## 2024-10-24 RX ADMIN — Medication 600 MILLIGRAM(S): at 05:22

## 2024-10-24 RX ADMIN — Medication 975 MILLIGRAM(S): at 08:45

## 2024-10-24 RX ADMIN — Medication 1 TABLET(S): at 12:05

## 2024-10-24 RX ADMIN — Medication 600 MILLIGRAM(S): at 18:12

## 2024-10-24 RX ADMIN — Medication 600 MILLIGRAM(S): at 12:05

## 2024-10-24 NOTE — PROGRESS NOTE ADULT - ASSESSMENT
A: 34y PPD# s/p  doing well. Diagnosed with PEC without severe features    Plan:  Analgesia prn  Regular diet  Follow up am labs  Routine post partum care  Continue to monitor BP

## 2024-10-24 NOTE — CHART NOTE - NSCHARTNOTEFT_GEN_A_CORE
Patient meets criteria for PEC w/o sever features    33yo female PPD #0 from  10/23/24. Patient meets criteria for PEC w/o s/f based on:   -B/Ps of 150/80 10/22/24 @ 22:07 and 146/79 10/23/24 @04:34     -protein/creatinine ratio 1:1 uncontaminated   -Platelets 131   -AST/ALT 29/45  - Denies s/s of PEC    Diagnosis discussed with patient   -Patient denies headache, visual change, RUQ/epigastric pain, N/V.  -Has functioning B/P monitor at home  -instructions given on BP monitoring; TID; call MD office if greater than 140/90; report to emergency room is greater than 160/110  -reviewed signs and symptoms related to preeclampsia with patient such as HA, Change in vision, N/V. RUQ pain   -keep log BP's to present to MD during appointment for BP check within 1 week  -Repeat PEC labs @13:00  -All questions answered, patient verbalized understanding   -D/W MICKIE Kumar-BC
called by RN for pt c/o pain in upper thighs    35yo  PPD#1 s/p  over second degree laceration c/b PEC without severe features.  mL. Pt seen and evaluated at bedside. Pt sitting SF in bed nursing infant. Pt endorsing feeling sharp, cramping pain beginning in pelvic area radiating to upper thighs while laying in bed, which intermittently radiates to lower back as well. Pt states pain worsens during feeding. Discussed with patient that this is normal postpartum uterine contractions that can be experienced radiating to upper thighs and lower back and that may intensify with breastfeeding. Discussed tylenol/motrin/heat packs/PO hydration and ambulation to help relieve/alleviate cramping pain. Heat packs applied to lower back as patient states she is not experiencing upper thigh pain at this time.     Vitals:   Vital Signs Last 24 Hrs  T(C): 36.7 (24 Oct 2024 17:24)  T(F): 98 (24 Oct 2024 17:24)  HR: 56 (24 Oct 2024 17:24)   BP: 124/76 (24 Oct 2021 17:24)  RR: 17 (24 Oct 2024 17:24)   SpO2: 99% (24 Oct 2024 17:24)  Parameters below as of 24 Oct 2024 17:24  Patient On (Oxygen Delivery Method): room air    Smitha Urias NP

## 2024-10-25 VITALS
HEART RATE: 64 BPM | DIASTOLIC BLOOD PRESSURE: 78 MMHG | RESPIRATION RATE: 17 BRPM | TEMPERATURE: 98 F | SYSTOLIC BLOOD PRESSURE: 117 MMHG | OXYGEN SATURATION: 98 %

## 2024-10-25 RX ADMIN — Medication 600 MILLIGRAM(S): at 00:10

## 2024-10-25 RX ADMIN — Medication 600 MILLIGRAM(S): at 13:48

## 2024-10-25 RX ADMIN — SODIUM CHLORIDE 3 MILLILITER(S): 9 INJECTION, SOLUTION INTRAMUSCULAR; INTRAVENOUS; SUBCUTANEOUS at 05:57

## 2024-10-25 RX ADMIN — Medication 600 MILLIGRAM(S): at 12:48

## 2024-10-25 RX ADMIN — Medication 25 MICROGRAM(S): at 06:09

## 2024-10-25 RX ADMIN — Medication 600 MILLIGRAM(S): at 06:40

## 2024-10-25 RX ADMIN — Medication 600 MILLIGRAM(S): at 06:09

## 2024-10-25 NOTE — PROGRESS NOTE ADULT - SUBJECTIVE AND OBJECTIVE BOX
S: 33yo PPD#2 s/p  c/w PEC without severe features.  HELLP labs WNL.  Patient feels well. Pain is well controlled. She is tolerating a regular diet and passing flatus. She is voiding spontaneously, and ambulating without difficulty. Denies CP/SOB. Denies lightheadedness/dizziness. Denies N/V.        O:  Vitals:   Vital Signs Last 24 Hrs  T(C): 36.4 (25 Oct 2024 04:48), Max: 36.7 (24 Oct 2024 17:24)  T(F): 97.6 (25 Oct 2024 04:48), Max: 98 (24 Oct 2024 17:24)  HR: 50 (25 Oct 2024 04:48) (46 - 60)  BP: 114/57 (25 Oct 2024 04:48) (109/66 - 147/86)  BP(mean): --  RR: 18 (25 Oct 2024 04:48) (17 - 19)  SpO2: 98% (25 Oct 2024 04:48) (98% - 100%)    Parameters below as of 25 Oct 2024 04:48  Patient On (Oxygen Delivery Method): room air        MEDICATIONS  (STANDING):  acetaminophen     Tablet .. 975 milliGRAM(s) Oral <User Schedule>  diphtheria/tetanus/pertussis (acellular) Vaccine (Adacel) 0.5 milliLiter(s) IntraMuscular once  ibuprofen  Tablet. 600 milliGRAM(s) Oral every 6 hours  levothyroxine 25 MICROGram(s) Oral daily  prenatal multivitamin 1 Tablet(s) Oral daily  sodium chloride 0.9% lock flush 3 milliLiter(s) IV Push every 8 hours    MEDICATIONS  (PRN):  benzocaine 20%/menthol 0.5% Spray 1 Spray(s) Topical every 6 hours PRN for Perineal discomfort  dibucaine 1% Ointment 1 Application(s) Topical every 6 hours PRN Perineal discomfort  diphenhydrAMINE 25 milliGRAM(s) Oral every 6 hours PRN Pruritus  hydrocortisone 1% Cream 1 Application(s) Topical every 6 hours PRN Moderate Pain (4-6)  lanolin Ointment 1 Application(s) Topical every 6 hours PRN nipple soreness  magnesium hydroxide Suspension 30 milliLiter(s) Oral two times a day PRN Constipation  oxyCODONE    IR 5 milliGRAM(s) Oral once PRN Moderate to Severe Pain (4-10)  oxyCODONE    IR 5 milliGRAM(s) Oral every 3 hours PRN Moderate to Severe Pain (4-10)  pramoxine 1%/zinc 5% Cream 1 Application(s) Topical every 4 hours PRN Moderate Pain (4-6)  simethicone 80 milliGRAM(s) Chew every 4 hours PRN Gas  witch hazel Pads 1 Application(s) Topical every 4 hours PRN Perineal discomfort      Labs:  Blood type: A Positive  Rubella IgG: RPR: Negative                          12.7   12.99[H] >-----------< 125[L]    ( 10-24 @ 05:50 )             37.9                        12.4   13.94[H] >-----------< 129[L]    ( 10-23 @ 13:28 )             37.2                        13.6   9.51 >-----------< 131[L]    ( 10-22 @ 21:36 )             40.8    10-24-24 @ 05:50      137  |  102  |  16  ----------------------------<  57[L]  4.4   |  21[L]  |  0.75    10-23-24 @ 13:28      138  |  104  |  15  ----------------------------<  82  4.4   |  21[L]  |  0.87    10-22-24 @ 21:36      139  |  105  |  13  ----------------------------<  65[L]  4.3   |  19[L]  |  0.67        Ca    9.0      24 Oct 2024 05:50  Ca    9.2      23 Oct 2024 13:28  Ca    9.7      22 Oct 2024 21:36    TPro  6.0  /  Alb  3.2[L]  /  TBili  <0.2  /  DBili  x   /  AST  37[H]  /  ALT  47[H]  /  AlkPhos  166[H]  10-24-24 @ 05:50  TPro  5.9[L]  /  Alb  3.3  /  TBili  0.2  /  DBili  x   /  AST  38[H]  /  ALT  46[H]  /  AlkPhos  175[H]  10-23-24 @ 13:28  TPro  6.7  /  Alb  3.5  /  TBili  0.2  /  DBili  x   /  AST  29  /  ALT  45[H]  /  AlkPhos  197[H]  10-22-24 @ 21:36      Physical Exam:  General: NAD  Abdomen: soft, non-tender, non-distended, fundus firm  Vaginal: Lochia wnl  Extremities: No calf pain    A/P: 33yo PPD#2 s/p .  Patient is stable and doing well post-partum.      #PEC without severe features  -HELLP AST/ALT 38/46-->37/47  -Has BP Cuff at home, offered a prescription for BP cuff but patient declined   -instructions given on BP monitoring; TID; call MD office if greater than 140/90; report to emergency room is greater than 160/110  -reviewed signs and symptoms related to preeclampsia with patient such as HA, Change in vision, N/V. RUQ pain   -keep log BP's to present to MD during appointment in the week  -All questions answered, patient verbalized understanding     #Bradycardia  -EKG sinus ruddy   -asymptomatic    #Postpartum  - Pain controlled, continue current pain regimen  - Increase ambulation, SCDs when not ambulating  - Continue regular diet  - Discharge planned for today    Luciana CORADO
PPD#1- ATTENDING NOTE    S: Patient doing well. Minimal lochia. Pain controlled.    O: Vital Signs Last 24 Hrs  T(C): 36.3 (24 Oct 2024 05:36), Max: 37 (23 Oct 2024 13:12)  T(F): 97.3 (24 Oct 2024 05:36), Max: 98.6 (23 Oct 2024 13:12)  HR: 44 (24 Oct 2024 05:36) (44 - 58)  BP: 134/77 (24 Oct 2024 05:36) (111/63 - 141/81)  BP(mean): --  RR: 19 (24 Oct 2024 05:36) (17 - 19)  SpO2: 99% (24 Oct 2024 05:36) (96% - 100%)    Parameters below as of 24 Oct 2024 05:36  Patient On (Oxygen Delivery Method): room air        Gen: NAD  Abd: soft, NT, ND, fundus firm below umbilicus  Lochia: moderate  Ext: no tenderness, no hyper reflexia    Labs:                        12.7   12.99 )-----------( 125      ( 24 Oct 2024 05:50 )             37.9       acetaminophen     Tablet .. 975 milliGRAM(s) Oral <User Schedule>  benzocaine 20%/menthol 0.5% Spray 1 Spray(s) Topical every 6 hours PRN  dibucaine 1% Ointment 1 Application(s) Topical every 6 hours PRN  diphenhydrAMINE 25 milliGRAM(s) Oral every 6 hours PRN  diphtheria/tetanus/pertussis (acellular) Vaccine (Adacel) 0.5 milliLiter(s) IntraMuscular once  hydrocortisone 1% Cream 1 Application(s) Topical every 6 hours PRN  ibuprofen  Tablet. 600 milliGRAM(s) Oral every 6 hours  lanolin Ointment 1 Application(s) Topical every 6 hours PRN  levothyroxine 25 MICROGram(s) Oral daily  magnesium hydroxide Suspension 30 milliLiter(s) Oral two times a day PRN  oxyCODONE    IR 5 milliGRAM(s) Oral every 3 hours PRN  oxyCODONE    IR 5 milliGRAM(s) Oral once PRN  pramoxine 1%/zinc 5% Cream 1 Application(s) Topical every 4 hours PRN  prenatal multivitamin 1 Tablet(s) Oral daily  simethicone 80 milliGRAM(s) Chew every 4 hours PRN  sodium chloride 0.9% lock flush 3 milliLiter(s) IV Push every 8 hours  witch hazel Pads 1 Application(s) Topical every 4 hours PRN

## 2024-11-01 PROBLEM — Z00.00 ENCOUNTER FOR PREVENTIVE HEALTH EXAMINATION: Status: ACTIVE | Noted: 2024-11-01

## 2025-04-03 ENCOUNTER — OFFICE (OUTPATIENT)
Facility: LOCATION | Age: 35
Setting detail: OPHTHALMOLOGY
End: 2025-04-03
Payer: COMMERCIAL

## 2025-04-03 DIAGNOSIS — H01.001: ICD-10-CM

## 2025-04-03 DIAGNOSIS — H00.012: ICD-10-CM

## 2025-04-03 DIAGNOSIS — H01.002: ICD-10-CM

## 2025-04-03 DIAGNOSIS — H01.004: ICD-10-CM

## 2025-04-03 PROBLEM — H52.7 REFRACTIVE ERROR: Status: ACTIVE | Noted: 2025-04-03

## 2025-04-03 PROBLEM — H01.005 BLEPHARITIS; RIGHT LOWER LID, LEFT UPPER LID, LEFT LOWER LID, RIGHT UPPER LID: Status: ACTIVE | Noted: 2025-04-03

## 2025-04-03 PROCEDURE — 92004 COMPRE OPH EXAM NEW PT 1/>: CPT | Performed by: STUDENT IN AN ORGANIZED HEALTH CARE EDUCATION/TRAINING PROGRAM

## 2025-04-03 ASSESSMENT — CONFRONTATIONAL VISUAL FIELD TEST (CVF)
OS_FINDINGS: FULL
OD_FINDINGS: FULL

## 2025-04-03 ASSESSMENT — REFRACTION_CURRENTRX
OS_SPHERE: -1.50
OS_CYLINDER: -0.25
OD_CYLINDER: -0.25
OD_OVR_VA: 20/
OS_AXIS: 123
OD_SPHERE: -1.25
OS_OVR_VA: 20/
OD_AXIS: 081

## 2025-04-03 ASSESSMENT — REFRACTION_AUTOREFRACTION
OS_CYLINDER: -0.50
OS_AXIS: 125
OD_SPHERE: -1.25
OD_AXIS: 071
OD_CYLINDER: -0.50
OS_SPHERE: -1.00

## 2025-04-03 ASSESSMENT — KERATOMETRY
OS_AXISANGLE_DEGREES: 098
OD_AXISANGLE_DEGREES: 090
METHOD_AUTO_MANUAL: AUTO
OD_K2POWER_DIOPTERS: 44.75
OS_K2POWER_DIOPTERS: 45.00
OD_K1POWER_DIOPTERS: 44.75
OS_K1POWER_DIOPTERS: 44.75

## 2025-04-03 ASSESSMENT — LID EXAM ASSESSMENTS
OD_MEIBOMITIS: 1+
OD_BLEPHARITIS: RLL RUL 1+
OS_BLEPHARITIS: LLL LUL 1+
OS_MEIBOMITIS: 1+

## 2025-04-03 ASSESSMENT — VISUAL ACUITY
OD_BCVA: 20/20-1
OS_BCVA: 20/20-1

## 2025-04-03 ASSESSMENT — TONOMETRY
OS_IOP_MMHG: 20
OD_IOP_MMHG: 20

## 2025-05-08 ENCOUNTER — OFFICE (OUTPATIENT)
Facility: LOCATION | Age: 35
Setting detail: OPHTHALMOLOGY
End: 2025-05-08
Payer: COMMERCIAL

## 2025-05-08 DIAGNOSIS — H01.002: ICD-10-CM

## 2025-05-08 DIAGNOSIS — H52.13: ICD-10-CM

## 2025-05-08 DIAGNOSIS — H01.001: ICD-10-CM

## 2025-05-08 DIAGNOSIS — H01.004: ICD-10-CM

## 2025-05-08 DIAGNOSIS — H00.12: ICD-10-CM

## 2025-05-08 PROBLEM — H01.005 BLEPHARITIS; RIGHT LOWER LID, LEFT UPPER LID, LEFT LOWER LID, RIGHT UPPER LID: Status: ACTIVE | Noted: 2025-05-08

## 2025-05-08 PROCEDURE — 92012 INTRM OPH EXAM EST PATIENT: CPT | Performed by: STUDENT IN AN ORGANIZED HEALTH CARE EDUCATION/TRAINING PROGRAM

## 2025-05-08 PROCEDURE — 92015 DETERMINE REFRACTIVE STATE: CPT | Performed by: STUDENT IN AN ORGANIZED HEALTH CARE EDUCATION/TRAINING PROGRAM

## 2025-05-08 ASSESSMENT — REFRACTION_CURRENTRX
OS_CYLINDER: -0.25
OD_SPHERE: -1.25
OD_AXIS: 076
OD_OVR_VA: 20/
OS_CYLINDER: -0.25
OS_VPRISM_DIRECTION: SV
OS_OVR_VA: 20/
OD_VPRISM_DIRECTION: SV
OS_AXIS: 123
OD_CYLINDER: -0.25
OD_CYLINDER: -0.25
OD_AXIS: 081
OS_AXIS: 123
OS_SPHERE: -1.50
OS_SPHERE: -1.50
OD_SPHERE: -1.25
OS_OVR_VA: 20/
OD_OVR_VA: 20/

## 2025-05-08 ASSESSMENT — REFRACTION_AUTOREFRACTION
OS_AXIS: 121
OS_SPHERE: -1.00
OD_CYLINDER: -0.50
OS_CYLINDER: -0.25
OD_AXIS: 063
OD_SPHERE: -0.75

## 2025-05-08 ASSESSMENT — KERATOMETRY
OD_K2POWER_DIOPTERS: 45.00
METHOD_AUTO_MANUAL: AUTO
OD_AXISANGLE_DEGREES: 093
OD_K1POWER_DIOPTERS: 44.75
OS_K1POWER_DIOPTERS: 44.75
OS_AXISANGLE_DEGREES: 092
OS_K2POWER_DIOPTERS: 45.50

## 2025-05-08 ASSESSMENT — REFRACTION_MANIFEST
OS_SPHERE: -1.25
OD_VA1: 20/20
OS_CYLINDER: -0.25
OD_SPHERE: -1.25
OD_AXIS: 60
OS_VA1: 20/20
OS_AXIS: 120
OD_CYLINDER: -0.50

## 2025-05-08 ASSESSMENT — LID EXAM ASSESSMENTS
OS_BLEPHARITIS: LLL LUL 1+
OS_MEIBOMITIS: 1+
OD_MEIBOMITIS: 1+
OD_BLEPHARITIS: RLL RUL 1+

## 2025-05-08 ASSESSMENT — VISUAL ACUITY
OD_BCVA: 20/20--
OS_BCVA: 20/20-2

## 2025-05-08 ASSESSMENT — TONOMETRY: OS_IOP_MMHG: 20
